# Patient Record
Sex: MALE | Race: WHITE | Employment: UNEMPLOYED | URBAN - NONMETROPOLITAN AREA
[De-identification: names, ages, dates, MRNs, and addresses within clinical notes are randomized per-mention and may not be internally consistent; named-entity substitution may affect disease eponyms.]

---

## 2021-08-15 NOTE — PROGRESS NOTES
[place] [facility]  Pulmonary medicine clinic initial consult note. Patient: Lupe Barker  : 1965      Chief complaint/Ruby: Lupe Barker is a 49 y.o. old male came for further evaluation regarding his ? COPD and shortness of breath with referral from Monique Hernandez MD.     He was diagnosed with chronic obstructive pulmonary disease by Dr. Marci العلي MD pulmonologist at [address] [place] [address] in 2015. He is currently using   Spiriva Respimat 2 puffs daily  Albuterol HFA 2 puffs every 6 hourly as needed. He is having shortness of breath:Yes  Onset: Gradual  Duration: Several years  Diurnal variation:  None. Functional status prior to beginning of symptoms: Distance he can walk on level ground: 10 to 11 miles. Current functional capacity on level ground: Distance he can walk on level ground: 100 feet / 15 minutes  He can climb steps: No  He gives a history of orthopnea:Yes. He is currently using 5 pillows to sleep  He gives a history of paroxysmal nocturnal dyspnea:No       He is having cough: Yes  Duration of cough: for several years  His cough is associated with sputum production: Yes. Especially in the morning  The sputum color: Light yellow to green  Hemoptysis:No  Diurnal variation: None.         He is having chest pain:No    He is currently using any oxygen supplementation at rest, exercise or during sleep/at night time: No    He was ever diagnosed with following pulmonary diseases:  Asthma:NO  Pulmonary fibrosis:NO  Sarcoidosis:NO  Pulmonary embolism: NO  Pulmonary hypertension:NO  Pleural effusion/s in the past:NO    He ever diagnosed with connective tissue diseases including Systemic lupus Erythematosus, Rheumatoid arthritis etc:NO    He ever diagnosed with pulmonary tuberculosis in the past:NO  He ever recently exposed to patients with tuberculosis:NO  He ever recently travelled to endemic places of tuberculosis or out side [place]:NO  His ever tested for PPD in the past: NO    He ever diagnosed with COVID-19 infection in the past:No.    So far he did not received any COVID-19 vaccines. He denies any history of Glucoma or urinary retention in the past    He is currently waiting for approval by care source to see a cardiologist for further evaluation of his exertional dyspnea and bilateral leg edema. 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  He is not working since                      History of tobacco smoking:Yes  Amount of tobacco smokin.5 PPD. Years of tobacco smokin                                 Quit smoking: No.              Current smoker: Yes. Amount of current tobacco smokin.5 PPD    History of recreational or IV drug use in the past: He smokes marijuana from street. He smokes 2 joints of marijuana per day. History of Alcohol use: No.       History of exposure to coal mines/coal dust: NO  History of exposure to foundry dust/welding: NO  History of exposure to quarry/silica/sandblasting: NO  History of exposure to asbestos/working with breaks/ships: NO  History of exposure to farm dust: NO  History of recent travel to long distances: NO  History of exposure to birds, pigeons, or chickens in the past:NO  Pet animals at home:Yes  Dogs: 3  Cats: 0    History of pulmonary embolism in the past: No            History of DVT in the past:No                             Review of Systems:   General/Constitutional: He gained approximately 50 pounds of weight in the last 1 year with normal appetite. No fever or chills. HENT: Negative. Eyes: Negative. Upper respiratory tract: No nasal stuffiness or post nasal drip. Lower respiratory tract/ lungs: No cough or sputum production. No hemoptysis. Cardiovascular: No palpitations or chest pain.   Gastrointestinal: No nausea or vomiting. Neurological: No focal neurologiacal weakness. Extremities: Bilateral leg edema. Musculoskeletal: No complaints. Genitourinary: No complaints. Hematological: Negative. Psychiatric/Behavioral: Negative. Skin: No itching. Current Medications:          No past medical history on file. Past Surgical History:   Procedure Laterality Date    BUNIONECTOMY      CHOLECYSTECTOMY      COLONOSCOPY         Allergies   Allergen Reactions    Codeine        Current Outpatient Medications   Medication Sig Dispense Refill    hydrocortisone 2.5 % ointment Apply topically 2 times daily Apply topically 2 times daily.  lisinopril (PRINIVIL;ZESTRIL) 40 MG tablet Take 40 mg by mouth daily      metFORMIN (GLUCOPHAGE) 500 MG tablet Take 500 mg by mouth 2 times daily (with meals)      METHOCARBAMOL PO Take by mouth      metoprolol tartrate (LOPRESSOR) 25 MG tablet Take 25 mg by mouth 2 times daily      omeprazole (PRILOSEC) 40 MG delayed release capsule Take 40 mg by mouth daily      PARoxetine (PAXIL) 40 MG tablet Take 40 mg by mouth 2 times daily      sucralfate (CARAFATE) 1 GM tablet Take 1 g by mouth 4 times daily      tiotropium (SPIRIVA RESPIMAT) 2.5 MCG/ACT AERS inhaler Inhale 2 puffs into the lungs daily      traMADol (ULTRAM) 50 MG tablet Take 50 mg by mouth every 6 hours as needed for Pain.  traZODone (DESYREL) 100 MG tablet Take 100 mg by mouth nightly      aspirin 81 MG EC tablet Take 81 mg by mouth daily      albuterol sulfate  (90 Base) MCG/ACT inhaler Inhale 2 puffs into the lungs every 6 hours as needed for Wheezing       No current facility-administered medications for this visit. No family history on file.         Physical Exam:    VITALS:  /78 (Site: Left Lower Arm, Position: Sitting, Cuff Size: Medium Adult)   Pulse 80   Temp 98.5 °F (36.9 °C)   Ht 6' 2\" (1.88 m)   Wt (!) 316 lb 9.6 oz (143.6 kg)   SpO2 98% Comment: room air at rest  BMI 40.65 kg/m² Nursing note and vitals reviewed. Constitutional: Patient appears well built and well nourished. No distress. Patient is oriented to person, place, and time. HENT:   Head: Normocephalic and atraumatic. Right Ear: External ear normal.   Left Ear: External ear normal.   Mouth/Throat: Oropharynx is clear and moist.  No oral thrush. Eyes: Conjunctivae are normal. Pupils are equal, round, and reactive to light. No scleral icterus. Neck: Neck supple. No JVD present. No tracheal deviation present. Cardiovascular: Normal rate, regular rhythm, normal heart sounds. No murmur heard. Pulmonary/Chest: Effort normal and breath sounds normal. No stridor. No respiratory distress. Occasional expiratory wheezes. No rales. Patient exhibits no tenderness. Abdominal: Soft. Patient exhibits no distension. No tenderness. Musculoskeletal: Normal range of motion. Extremities: Patient exhibits 1+ bilateral leg edema and no tenderness. Lymphadenopathy:  No cervical adenopathy. Neurological: Patient is alert and oriented to person, place, and time. Skin: Skin is warm and dry. Patient is not diaphoretic. Psychiatric: Patient  has a normal mood and affect. Patient behavior is normal.     Mallampati airway Class: 3  Neck Circumference: 17.25 inches    Diagnostic Data:    Radiological Data: 08/09/18      No radiology films were available for me to review    Pulmonary function tests:  None available in epic        Assessment:  - COPD-of uncertain severity. Need pulmonary function tests. He is currently using a Spiriva Respimat 2puffs daily along with albuterol HFA 2 puffs every 6 hourly as needed.  -Obstructive sleep apnea diagnosed by sleep study performed at [facility] sleep lab by Dr. Jose L Bustamante MD, [facility]. Patient currently in need of new CPAP device. His old CPAP machine quit working.   He is currently not on any treatment.  -Essential hypertension on treatment with medications under control.  -Exertional dyspnea with bilateral leg edema. Differential include COPD and ? Onset congestive heart failure. He is currently waiting for approval by care source to see a cardiologist for further evaluation of his exertional dyspnea and bilateral leg edema.  -Type 2 diabetes mellitus on treatment. He follows with the family physician  -GERD on treatment with PPI  -Depression on treatment with Paxil 40 mg p.o. twice daily  -He is a chronic tobacco smoker for 42 years with a 2.5 packs of cigarettes per day. He cut down his tobacco smoke to half a pack of cigarettes per day for the last 1 month. Recommendations/Plan:  -Please obtain his old sleep study, CPAP titration, latest PFTs and a chest x-ray report from Dr. Rui Bartholomew MD pulmonologist [facility].  -Please schedule patient for Sleep medicine consult/follow up at [address], [address] for Pulmonary Medicine with me as soon as possible for further evaluation and management of sleep apnea with his old sleep study reports from [facility].  Patient currently in need of new CPAP device. His old CPAP machine quit working.  -He was advised to keep his scheduled appointment with cardiologist for further evaluation of cardiac etiology for his exertional dyspnea. -Please obtain latest echocardiogram report from his cardiologist for next visit. - Patient to have chest X-ray PA and Lateral views in 1month to evaluate his lung fields due to his history of tobacco smoking. Chest Xray need to be done 2days before clinic visit. - Schedule patient for full pulmonary function tests before clinic visit.  -Continue Spiriva Respimat 2 INH once daily in am. Harris Rodas educated and demonstrated by me in my office how to use Spiriva Respimat . Patient verbalizes understanding. He was detailed about mechanism of action of drug along with associated side effects. He agreed to take the risk and medication.  He verbalizes understanding.  -

## 2021-08-19 ENCOUNTER — OFFICE VISIT (OUTPATIENT)
Dept: PULMONOLOGY | Age: 56
End: 2021-08-19
Payer: COMMERCIAL

## 2021-08-19 VITALS
DIASTOLIC BLOOD PRESSURE: 78 MMHG | WEIGHT: 315 LBS | HEIGHT: 74 IN | BODY MASS INDEX: 40.43 KG/M2 | HEART RATE: 80 BPM | TEMPERATURE: 98.5 F | OXYGEN SATURATION: 98 % | SYSTOLIC BLOOD PRESSURE: 118 MMHG

## 2021-08-19 DIAGNOSIS — Z77.22 TOBACCO SMOKE EXPOSURE: ICD-10-CM

## 2021-08-19 DIAGNOSIS — R06.09 EXERTIONAL DYSPNEA: ICD-10-CM

## 2021-08-19 DIAGNOSIS — J44.9 CHRONIC OBSTRUCTIVE PULMONARY DISEASE, UNSPECIFIED COPD TYPE (HCC): Primary | ICD-10-CM

## 2021-08-19 PROCEDURE — G8926 SPIRO NO PERF OR DOC: HCPCS | Performed by: INTERNAL MEDICINE

## 2021-08-19 PROCEDURE — 3023F SPIROM DOC REV: CPT | Performed by: INTERNAL MEDICINE

## 2021-08-19 PROCEDURE — G8417 CALC BMI ABV UP PARAM F/U: HCPCS | Performed by: INTERNAL MEDICINE

## 2021-08-19 PROCEDURE — 4004F PT TOBACCO SCREEN RCVD TLK: CPT | Performed by: INTERNAL MEDICINE

## 2021-08-19 PROCEDURE — 99204 OFFICE O/P NEW MOD 45 MIN: CPT | Performed by: INTERNAL MEDICINE

## 2021-08-19 PROCEDURE — 3017F COLORECTAL CA SCREEN DOC REV: CPT | Performed by: INTERNAL MEDICINE

## 2021-08-19 PROCEDURE — G8427 DOCREV CUR MEDS BY ELIG CLIN: HCPCS | Performed by: INTERNAL MEDICINE

## 2021-08-19 RX ORDER — ALBUTEROL SULFATE 90 UG/1
2 AEROSOL, METERED RESPIRATORY (INHALATION) EVERY 6 HOURS PRN
COMMUNITY

## 2021-08-19 RX ORDER — TRAZODONE HYDROCHLORIDE 100 MG/1
100 TABLET ORAL NIGHTLY
COMMUNITY

## 2021-08-19 RX ORDER — TRAMADOL HYDROCHLORIDE 50 MG/1
50 TABLET ORAL EVERY 6 HOURS PRN
COMMUNITY

## 2021-08-19 RX ORDER — ASPIRIN 81 MG/1
81 TABLET ORAL DAILY
COMMUNITY

## 2021-08-19 RX ORDER — SUCRALFATE 1 G/1
1 TABLET ORAL 4 TIMES DAILY
COMMUNITY

## 2021-08-19 RX ORDER — LISINOPRIL 40 MG/1
40 TABLET ORAL DAILY
COMMUNITY

## 2021-08-19 RX ORDER — OMEPRAZOLE 40 MG/1
40 CAPSULE, DELAYED RELEASE ORAL DAILY
COMMUNITY

## 2021-08-19 RX ORDER — PAROXETINE HYDROCHLORIDE 40 MG/1
40 TABLET, FILM COATED ORAL 2 TIMES DAILY
COMMUNITY

## 2021-08-19 NOTE — PROGRESS NOTES
Neck Circumference -  17.25   Mallampati - 2  Lung Nodule Screening     [] Qualifies    [] Does not qualify   [] Declined    [] Completed

## 2021-08-19 NOTE — PATIENT INSTRUCTIONS
weight by controlling diet and doing exercise once cleared by his family physician.   -He was instructed to not to drive any motor vehicles or operate heavy equipment if he feels sleepy. - Patient educated to quit smoking tobacco and marijuana as soon as possible. Patient verbalizes understanding of adverse consequences of tobacco and marijuana smoking.  - Schedule patient for follow up with my clinic in 1month with above plan chest x-ray PA and lateral views and full pulmonary function tests. He was advised to make early appointment if needed.
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
## 2021-08-26 ENCOUNTER — INITIAL CONSULT (OUTPATIENT)
Dept: PULMONOLOGY | Age: 56
End: 2021-08-26
Payer: COMMERCIAL

## 2021-08-26 VITALS
BODY MASS INDEX: 40.32 KG/M2 | TEMPERATURE: 98.3 F | HEIGHT: 74 IN | OXYGEN SATURATION: 98 % | WEIGHT: 314.2 LBS | HEART RATE: 90 BPM | SYSTOLIC BLOOD PRESSURE: 128 MMHG | DIASTOLIC BLOOD PRESSURE: 72 MMHG

## 2021-08-26 DIAGNOSIS — G47.30 SLEEP APNEA, UNSPECIFIED TYPE: ICD-10-CM

## 2021-08-26 DIAGNOSIS — G47.10 HYPERSOMNIA: Primary | ICD-10-CM

## 2021-08-26 PROCEDURE — G8417 CALC BMI ABV UP PARAM F/U: HCPCS | Performed by: INTERNAL MEDICINE

## 2021-08-26 PROCEDURE — 4004F PT TOBACCO SCREEN RCVD TLK: CPT | Performed by: INTERNAL MEDICINE

## 2021-08-26 PROCEDURE — 3017F COLORECTAL CA SCREEN DOC REV: CPT | Performed by: INTERNAL MEDICINE

## 2021-08-26 PROCEDURE — G8427 DOCREV CUR MEDS BY ELIG CLIN: HCPCS | Performed by: INTERNAL MEDICINE

## 2021-08-26 PROCEDURE — 99214 OFFICE O/P EST MOD 30 MIN: CPT | Performed by: INTERNAL MEDICINE

## 2021-08-26 NOTE — PATIENT INSTRUCTIONS
Recommendations/Plan:  - Schedule patient for nocturnal polysomnogram (Sleep study) with split night protocol at [facility]  sleep lab to diagnose sleep apnea and to get optimal pressure I.e CPAP or BiPAP to start/continue PAP therapy. Patient to follow with my clinic at [facility] sleep clinic in 6 to 8 weeks with CPAP download for further evaluation.  -I had a discussion with patient regarding avialable treatment options for his sleep disorder breathing including but not limited to CPAP titration in the sleep lab Vs.Dental appliance placement with referral to a local dentist Vs other available surgical options including Uvulopalatopharyngoplasty, maxillomandibular ostomy and tracheostomy as last option. At the end of discussion, he decided on CPAP/BiPAP/AutoSV as a treatment if he found to have obstructive sleep apnea during above test/study.  -He advised to keep good compliance with current recommended pressure to get optimal results and clinical improvement.  -Follow with my clinic in 6 weeks to 2months for clinical reevaluation with review of download. -He was advised to call [employer] regarding supplies if needed. -He was advised to practice good sleep hygiene techniques. He was provided with a hand out.  -He was advised to loose weight by controlling diet and doing exercise once cleared by his Cardiologist   -Fred Escalona was advised to keep his scheduled follow up at [facility] for pulmonary disease) Pulmonary clinic for further evaluation and management of ? COPD  -He was instructed to not to drive any motor vehicles or operate heavy equipment if he feels sleepy. -He was advised call my office for earlier appointment if needed for worsening of sleep symptoms.  -Fred Escalona educated about my impression and plan. Patient verbalizes understanding.

## 2021-08-26 NOTE — PROGRESS NOTES
Chief Complaint: Sravani Pacheco is a sleep consult Psg 3/6/17    Mallampati airway Class:4  Neck Circumference:.17.25 Inches    [place] sleepiness score 8/26/21:   Sleep Apnea Quality of life Questionnaire:.

## 2021-08-31 NOTE — PROGRESS NOTES
green  Hemoptysis:No  Diurnal variation: None. He is having chest pain:No    He is currently using any oxygen supplementation at rest, exercise or during sleep/at night time: No    He was ever diagnosed with following pulmonary diseases:  Asthma:NO  Pulmonary fibrosis:NO  Sarcoidosis:NO  Pulmonary embolism: NO  Pulmonary hypertension:NO  Pleural effusion/s in the past:NO    He ever diagnosed with connective tissue diseases including Systemic lupus Erythematosus, Rheumatoid arthritis etc:NO    He ever diagnosed with pulmonary tuberculosis in the past:NO  He ever recently exposed to patients with tuberculosis:NO  He ever recently travelled to endemic places of tuberculosis or out side [place]:NO  His ever tested for PPD in the past: NO    He ever diagnosed with COVID-19 infection in the past:No.    So far he did not received any COVID-19 vaccines. He denies any history of Glucoma or urinary retention in the past    He is currently waiting for approval by care source to see a cardiologist for further evaluation of his exertional dyspnea and bilateral leg edema. 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  He is not working since                      History of tobacco smoking:Yes  Amount of tobacco smokin.5 PPD. Years of tobacco smokin                                 Quit smoking: No.              Current smoker: Yes. Amount of current tobacco smokin.5 PPD    History of recreational or IV drug use in the past: He smokes marijuana from street. He smokes 2 joints of marijuana per day.   History of Alcohol use: No.       History of exposure to coal mines/coal dust: NO  History of exposure to foundry dust/welding: NO  History of exposure to quarry/silica/sandblasting: NO  History of exposure to asbestos/working with breaks/ships: NO  History of exposure to farm dust: NO  History of recent travel to long distances: NO  History of exposure to birds, pigeons, or chickens in the past:NO  Pet animals at home:Yes  Dogs: 3  Cats: 0    History of pulmonary embolism in the past: No            History of DVT in the past:No                             Review of Systems:   General/Constitutional: he lost 38lbs of weight from 2017-from the time of his baseline sleep study and CPAP titration. He gained 1 pound of weight from his last visit. No fever or chills. HENT: Negative. Eyes: Negative. Upper respiratory tract: No nasal stuffiness or post nasal drip. Lower respiratory tract/ lungs: No cough or sputum production. Cardiovascular: No palpitations or chest pain. Gastrointestinal: No nausea or vomiting. Neurological: No focal neurologiacal weakness. Extremities: No edema. Musculoskeletal: No complaints. Genitourinary: No complaints. Hematological: Negative. Psychiatric/Behavioral: Negative. Skin: No itching. Current Medications:          No past medical history on file. Past Surgical History:   Procedure Laterality Date    BUNIONECTOMY      CHOLECYSTECTOMY      COLONOSCOPY         Allergies   Allergen Reactions    Codeine        Current Outpatient Medications   Medication Sig Dispense Refill    nitroGLYCERIN (NITROSTAT) 0.4 MG SL tablet Place 0.4 mg under the tongue every 5 minutes as needed for Chest pain up to max of 3 total doses. If no relief after 1 dose, call 911.  hydrocortisone 2.5 % ointment Apply topically 2 times daily Apply topically 2 times daily.       lisinopril (PRINIVIL;ZESTRIL) 40 MG tablet Take 40 mg by mouth daily      metFORMIN (GLUCOPHAGE) 500 MG tablet Take 500 mg by mouth 2 times daily (with meals)      metoprolol tartrate (LOPRESSOR) 25 MG tablet Take 25 mg by mouth 2 times daily      omeprazole (PRILOSEC) 40 MG delayed release capsule Take 40 mg by mouth daily      PARoxetine (PAXIL) 40 MG tablet Take 40 mg by mouth 2 times daily      traMADol (ULTRAM) 50 MG tablet Take 50 mg by mouth every 6 hours as needed for Pain.  traZODone (DESYREL) 100 MG tablet Take 100 mg by mouth nightly      aspirin 81 MG EC tablet Take 81 mg by mouth daily      albuterol sulfate  (90 Base) MCG/ACT inhaler Inhale 2 puffs into the lungs every 6 hours as needed for Wheezing      METHOCARBAMOL PO Take by mouth (Patient not taking: Reported on 9/30/2021)      sucralfate (CARAFATE) 1 GM tablet Take 1 g by mouth 4 times daily (Patient not taking: Reported on 9/30/2021)       No current facility-administered medications for this visit. No family history on file. Physical Exam:    VITALS:  /72 (Site: Left Lower Arm, Position: Sitting, Cuff Size: Medium Adult)   Pulse 75   Temp 97.8 °F (36.6 °C) (Temporal)   Ht 6' 2\" (1.88 m)   Wt (!) 315 lb (142.9 kg)   SpO2 98% Comment: room air at rest  BMI 40.44 kg/m²   Nursing note and vitals reviewed. Constitutional: Patient appears well built and well nourished. No distress. Patient is oriented to person, place, and time. HENT:   Head: Normocephalic and atraumatic. Right Ear: External ear normal.   Left Ear: External ear normal.   Mouth/Throat: Oropharynx is clear and moist.  No oral thrush. Eyes: Conjunctivae are normal. Pupils are equal, round, and reactive to light. No scleral icterus. Neck: Neck supple. No JVD present. No tracheal deviation present. Cardiovascular: Normal rate, regular rhythm, normal heart sounds. No murmur heard. Pulmonary/Chest: Effort normal and breath sounds normal. No stridor. No respiratory distress. No wheezes. No rales. Patient exhibits no tenderness. Abdominal: Soft. Patient exhibits no distension. No tenderness. Musculoskeletal: Normal range of motion. Extremities: Patient exhibits bilateral 1 + leg edema and no tenderness. Lymphadenopathy:  No cervical adenopathy.    Neurological: Patient is alert and oriented to person, place, and medications under control.  -Type 2 diabetes mellitus on treatment. He follows with the family physician  -GERD on treatment with PPI  -Depression on treatment with Paxil 40 mg p.o. twice daily  -He is a chronic tobacco smoker for 42 years with a 2.5 packs of cigarettes per day. He still smoking half a pack of cigarettes per day. -He is still smoking marijuana 1 joint every other day. Recommendations/Plan:  -He was advised to stop taking Spiriva Respimat  -Continue albuterol HFA 2 puffs every 6 hourly as needed for shortness of breath/wheezing until his methacholine challenge test is worse or available  -He was advised to keep his scheduled appointment for planned split-night sleep study at [facility] on 6 October 2021.  -Follow with [facility] sleep clinic with Ms. Denisa Anna CNP in 6 to 8 weeks for clinical reevaluation with review of download. -He was advised to call [facility] regarding supplies if needed. -He was advised call my office for earlier appointment if needed for worsening of sleep symptoms.  -He was advised to continue to practice good sleep hygiene practices.  -He was advised to loose weight by controlling diet and doing exercise once cleared by his cardiologist.  -Schedule patient for Methacholine challenge test before clinic visit  to further evaluate for hyperreactive air way disease before clinic visit.  -Schedule patient for Cardiology consult with Dr. Silvano Kuhn MD as soon as possible for further evaluation of cardiac etiology of exertional dyspnea. He told me that he is still waiting to get a referral from his family physician Dr. Arin Snyder MD  -Schedule patient for Echocardiogram with 2D doppler in 1month to further evaluate for Left and Rt ventricular function along with Pulmonary artery pressures.  -He was instructed to not to drive any motor vehicles or operate heavy equipment if he feels sleepy.    -Patient educated to quit smoking tobacco and marijuana as soon as possible. Patient verbalizes understanding of adverse consequences of tobacco and marijuana smoking.  - Schedule patient for follow up with my clinic in 1 to 2 months with methacholine challenge test and echocardiogram report along with a letter from Dr. Joceline Chin, MDs office. He was advised to make early appointment if needed. - Simran Garcia educated about my impression and plan. He verbalizes understanding.      -I personally reviewed updated the Past medical hx, Past surgical hx,Social hx, Family hx, Medications, Allergies in the discrete data section of the patient chart along with labs, Pulmonary medicine,Sleep medicine related, Pathological, Microbiological and Radiological investigations.

## 2021-09-20 DIAGNOSIS — Z77.22 TOBACCO SMOKE EXPOSURE: ICD-10-CM

## 2021-09-20 DIAGNOSIS — J44.9 CHRONIC OBSTRUCTIVE PULMONARY DISEASE, UNSPECIFIED COPD TYPE (HCC): ICD-10-CM

## 2021-09-20 DIAGNOSIS — R06.09 EXERTIONAL DYSPNEA: ICD-10-CM

## 2021-09-29 DIAGNOSIS — R06.09 EXERTIONAL DYSPNEA: ICD-10-CM

## 2021-09-29 DIAGNOSIS — J44.9 CHRONIC OBSTRUCTIVE PULMONARY DISEASE, UNSPECIFIED COPD TYPE (HCC): ICD-10-CM

## 2021-09-29 DIAGNOSIS — Z77.22 TOBACCO SMOKE EXPOSURE: ICD-10-CM

## 2021-09-30 ENCOUNTER — OFFICE VISIT (OUTPATIENT)
Dept: PULMONOLOGY | Age: 56
End: 2021-09-30
Payer: COMMERCIAL

## 2021-09-30 VITALS
TEMPERATURE: 97.8 F | SYSTOLIC BLOOD PRESSURE: 124 MMHG | DIASTOLIC BLOOD PRESSURE: 72 MMHG | WEIGHT: 315 LBS | BODY MASS INDEX: 40.43 KG/M2 | HEIGHT: 74 IN | OXYGEN SATURATION: 98 % | HEART RATE: 75 BPM

## 2021-09-30 DIAGNOSIS — R06.09 EXERTIONAL DYSPNEA: ICD-10-CM

## 2021-09-30 DIAGNOSIS — G47.33 OSA (OBSTRUCTIVE SLEEP APNEA): Primary | ICD-10-CM

## 2021-09-30 DIAGNOSIS — G47.10 HYPERSOMNIA: ICD-10-CM

## 2021-09-30 DIAGNOSIS — Z77.22 TOBACCO SMOKE EXPOSURE: ICD-10-CM

## 2021-09-30 PROCEDURE — G8417 CALC BMI ABV UP PARAM F/U: HCPCS | Performed by: INTERNAL MEDICINE

## 2021-09-30 PROCEDURE — 3017F COLORECTAL CA SCREEN DOC REV: CPT | Performed by: INTERNAL MEDICINE

## 2021-09-30 PROCEDURE — 99214 OFFICE O/P EST MOD 30 MIN: CPT | Performed by: INTERNAL MEDICINE

## 2021-09-30 PROCEDURE — G8427 DOCREV CUR MEDS BY ELIG CLIN: HCPCS | Performed by: INTERNAL MEDICINE

## 2021-09-30 PROCEDURE — 4004F PT TOBACCO SCREEN RCVD TLK: CPT | Performed by: INTERNAL MEDICINE

## 2021-09-30 RX ORDER — NITROGLYCERIN 0.4 MG/1
0.4 TABLET SUBLINGUAL EVERY 5 MIN PRN
COMMUNITY

## 2021-09-30 NOTE — PROGRESS NOTES
Neck Circumference -   17.75 inches  Mallampati - 4    Lung Nodule Screening     [] Qualifies    [] Does not qualify   [] Declined    [] Completed

## 2021-09-30 NOTE — PATIENT INSTRUCTIONS
Recommendations/Plan:  -He was advised to stop taking Spiriva Respimat  -Continue albuterol HFA 2 puffs every 6 hourly as needed for shortness of breath/wheezing until his methacholine challenge test is worse or available  -He was advised to keep his scheduled appointment for planned split-night sleep study at [facility] on 6 October 2021.  -Follow with [facility] sleep clinic with Ms. Nova Roberts CNP in 6 to 8 weeks for clinical reevaluation with review of download. -He was advised to call [employer] regarding supplies if needed. -He was advised call my office for earlier appointment if needed for worsening of sleep symptoms.  -He was advised to continue to practice good sleep hygiene practices.  -He was advised to loose weight by controlling diet and doing exercise once cleared by his cardiologist.  -Schedule patient for Methacholine challenge test before clinic visit  to further evaluate for hyperreactive air way disease before clinic visit.  -Schedule patient for Cardiology consult with Dr. Chandan Urrutia MD as soon as possible for further evaluation of cardiac etiology of exertional dyspnea. He told me that he is still waiting to get a referral from his family physician Dr. Mauri Whitley MD  -Schedule patient for Echocardiogram with 2D doppler in 1month to further evaluate for Left and Rt ventricular function along with Pulmonary artery pressures.  -He was instructed to not to drive any motor vehicles or operate heavy equipment if he feels sleepy. -Patient educated to quit smoking tobacco and marijuana as soon as possible. Patient verbalizes understanding of adverse consequences of tobacco and marijuana smoking.  - Schedule patient for follow up with my clinic in 1 to 2 months with methacholine challenge test and echocardiogram report along with a letter from Dr. Chandan Urrutia, [place] office. He was advised to make early appointment if needed.   - Luisa Howard educated about my impression and plan. He verbalizes understanding.

## 2021-11-18 ENCOUNTER — TELEPHONE (OUTPATIENT)
Dept: PULMONOLOGY | Age: 56
End: 2021-11-18

## 2021-11-29 ENCOUNTER — HOSPITAL ENCOUNTER (OUTPATIENT)
Dept: GENERAL RADIOLOGY | Age: 56
Discharge: HOME | End: 2021-11-29

## 2021-11-29 DIAGNOSIS — Z00.6 EXAMINATION FOR NORMAL COMPARISON FOR CLINICAL RESEARCH: ICD-10-CM

## 2023-04-17 ENCOUNTER — HOSPITAL ENCOUNTER (OUTPATIENT)
Age: 58
Setting detail: SPECIMEN
Discharge: HOME | End: 2023-04-17

## 2023-04-17 LAB
ABSOLUTE EOS #: 0.08 K/UL (ref 0–0.44)
ABSOLUTE IMMATURE GRANULOCYTE: 0.06 K/UL (ref 0–0.3)
ABSOLUTE LYMPH #: 3.22 K/UL (ref 1.1–3.7)
ABSOLUTE MONO #: 0.86 K/UL (ref 0.1–1.2)
ALBUMIN SERPL-MCNC: 3.9 G/DL (ref 3.5–5.2)
ALBUMIN/GLOBULIN RATIO: 1.2 (ref 1–2.5)
ALP SERPL-CCNC: 127 U/L (ref 40–129)
ALT SERPL-CCNC: 17 U/L (ref 5–41)
ANION GAP SERPL CALCULATED.3IONS-SCNC: 12 MMOL/L (ref 9–17)
AST SERPL-CCNC: 15 U/L
BASOPHILS # BLD: 1 % (ref 0–2)
BASOPHILS ABSOLUTE: 0.09 K/UL (ref 0–0.2)
BILIRUB SERPL-MCNC: 0.7 MG/DL (ref 0.3–1.2)
BUN SERPL-MCNC: 10 MG/DL (ref 6–20)
CALCIUM SERPL-MCNC: 9.3 MG/DL (ref 8.6–10.4)
CHLORIDE SERPL-SCNC: 103 MMOL/L (ref 98–107)
CHOLEST SERPL-MCNC: 177 MG/DL
CHOLESTEROL/HDL RATIO: 6.1
CO2 SERPL-SCNC: 24 MMOL/L (ref 20–31)
CREAT SERPL-MCNC: 0.68 MG/DL (ref 0.7–1.2)
EOSINOPHILS RELATIVE PERCENT: 1 % (ref 1–4)
GFR SERPL CREATININE-BSD FRML MDRD: >60 ML/MIN/1.73M2
GLUCOSE SERPL-MCNC: 140 MG/DL (ref 70–99)
HCT VFR BLD AUTO: 50.7 % (ref 40.7–50.3)
HDLC SERPL-MCNC: 29 MG/DL
HGB BLD-MCNC: 17 G/DL (ref 13–17)
IMMATURE GRANULOCYTES: 1 %
LDLC SERPL CALC-MCNC: 132 MG/DL (ref 0–130)
LYMPHOCYTES # BLD: 27 % (ref 24–43)
MCH RBC QN AUTO: 30.6 PG (ref 25.2–33.5)
MCHC RBC AUTO-ENTMCNC: 33.5 G/DL (ref 28.4–34.8)
MCV RBC AUTO: 91.4 FL (ref 82.6–102.9)
MONOCYTES # BLD: 7 % (ref 3–12)
NRBC AUTOMATED: 0 PER 100 WBC
PDW BLD-RTO: 12.8 % (ref 11.8–14.4)
PLATELET # BLD AUTO: 267 K/UL (ref 138–453)
PMV BLD AUTO: 9.8 FL (ref 8.1–13.5)
POTASSIUM SERPL-SCNC: 3.6 MMOL/L (ref 3.7–5.3)
PROSTATE SPECIFIC ANTIGEN: 0.63 NG/ML
PROT SERPL-MCNC: 7.1 G/DL (ref 6.4–8.3)
RBC # BLD: 5.55 M/UL (ref 4.21–5.77)
SEG NEUTROPHILS: 63 % (ref 36–65)
SEGMENTED NEUTROPHILS ABSOLUTE COUNT: 7.6 K/UL (ref 1.5–8.1)
SODIUM SERPL-SCNC: 139 MMOL/L (ref 135–144)
TRIGL SERPL-MCNC: 79 MG/DL
TSH SERPL-ACNC: 2.82 UIU/ML (ref 0.3–5)
WBC # BLD AUTO: 11.9 K/UL (ref 3.5–11.3)